# Patient Record
(demographics unavailable — no encounter records)

---

## 2025-07-17 NOTE — DISCUSSION/SUMMARY
[FreeTextEntry1] : REASON FOR CONSULT Bud Holt is a 44-year-old male who was referred by Dr. Jeffery Magallon for cancer genetic counseling and risk assessment for cascade genetic testing of a known familial BRCA1 mutation.   RELEVANT MEDICAL HISTORY Mr. Holt is a healthy individual who has never had cancer. He has a family history of cancer, see below.  Mr. Holt shared his mother's family sharing letter which states that she underwent [facility] Comprehensive BRACAnalysis test and was found to have a deleterious mutation in BRCA1 ([id number]). This letter is signed by Dr. Wayne Lind, [facility] and written on 02/01/2005.   Mr. Holt consulted with Dr. Jeffery Magallon on 02/01/2023 and Dr. Magallon recommended routine chest wall exams and PSA testing by his PCP.  OTHER MEDICAL AND SURGICAL HISTORY: -	Medical History: None. -	Surgical History: Tonsillectomy  CANCER SCREENING HISTORY:   Colon: -	Colonoscopy: None. -	Upper Endoscopy: None. Prostate: -	PSA: None. -	VINICIO: None. Skin:   -	FBSE: most recent reported within past 3-4 years: Negative; Frequency: None. -	Lesions biopsied/removed: None.  SOCIAL HISTORY: -	Occupation:  -	Tobacco-product use: None. -	Environmental exposures (like asbestos/toxic chemicals/radiation): None.  FAMILY HISTORY: Maternal and paternal ancestry was reported as . Ashkenazi Protestant ancestry was denied. A detailed family history of cancer was ascertained. Relevant diagnoses are detailed below and in the scanned pedigree.  	 RISK ASSESSMENT: Mr. Holt has a 50% chance of having inherited the familial BRCA1 mutation.  We also offered expansion of his testing to include genes associated with adrenal tumors and prostate cancer given his family history, but Mr. Holt would like to proceed with testing for only BRCA1 and BRCA2 genes. He is already anxious about pursuing the BRCA1 testing and has been avoiding this testing for year, he would not like to learn about other cancer predisposition syndromes right now unless there is another known familial gene mutation.   We recommended genetic testing for BRCA1 and BRCA2 genes.   We discussed the risks, benefits and limitations, and implications of genetic testing. We also discussed the psychosocial implications of genetic testing. Possible test results were reviewed with Mr. Holt, along with associated medical management options. The Genetic Information Non-discrimination Act (JEMAL) was also reviewed.   Mr. Holt consented to the above-mentioned genetic testing panel. Blood was drawn in our laboratory and sent to [facility] today.  PLAN: 1.	Blood drawn today will be sent to [facility] for analysis.  2.	We will contact Mr. Holt once the results are available and will schedule a follow-up appointment, as needed. Results generally return in 2-3 weeks from the day the sample is received in the lab. 3.	Family member's report will be scanned to [facility] secure file cabinet.  For any additional questions please call [facility] at [phone].   Melanie Samayoa MS, [facility] Genetic Counselor, [facility]  CC:  Dr. Jeffery Magallon

## 2025-07-28 NOTE — DISCUSSION/SUMMARY
[FreeTextEntry1] : Informed patient he tested positive for a BRCA1 pathogenic mutation (c.5266dupC; [id number]). We briefly reviewed the report and recommended he schedule a follow-up collaborative appointment with his Genetic Counselor, Melanie Samayoa, and a physician on our team to review the implications for him and his family members in detail. Patient interested in scheduling follow-up, informed our scheduling team will reach out. A copy of the report was released to the patient via the [facility] portal and will be scanned to his chart.   Abelino Richardson MS, [facility] Genetic Counselor